# Patient Record
Sex: MALE | Race: WHITE | NOT HISPANIC OR LATINO | Employment: FULL TIME | URBAN - METROPOLITAN AREA
[De-identification: names, ages, dates, MRNs, and addresses within clinical notes are randomized per-mention and may not be internally consistent; named-entity substitution may affect disease eponyms.]

---

## 2018-07-03 ENCOUNTER — TELEPHONE (OUTPATIENT)
Dept: FAMILY MEDICINE | Facility: CLINIC | Age: 43
End: 2018-07-03

## 2018-07-03 DIAGNOSIS — C43.9 MALIGNANT MELANOMA, UNSPECIFIED SITE: Primary | ICD-10-CM

## 2018-07-03 NOTE — TELEPHONE ENCOUNTER
----- Message from Starr Guzman sent at 7/3/2018 11:30 AM CDT -----  Contact: self  Patient need to speak to someone about getting in today   Patient states he has a melanoma on his ear and has had it twice    Patient has appointment schedule for 8/7 but says he states he need to be seen today if possible    Please call to advice [phone]

## 2018-07-03 NOTE — TELEPHONE ENCOUNTER
Spoke with pt.  He stated that he has had melanoma x 2 in the past and would like to be seen today if possible because he is a . I explained that we cannot see him today and asked if he would like a referral to dermatology and he would like that to be done, and would like to be seen by one this week if possible. I explained that we will put the dermatology referral in and can go from there.  Thanks,Sheila

## 2018-07-03 NOTE — TELEPHONE ENCOUNTER
AILYN for pt with Dr. Avalos's contact information and notifying him that referral has been faxed to Dr. Avalos.  Sheila

## 2019-10-09 ENCOUNTER — HOSPITAL ENCOUNTER (EMERGENCY)
Facility: HOSPITAL | Age: 44
Discharge: HOME | End: 2019-10-09
Attending: INTERNAL MEDICINE
Payer: COMMERCIAL

## 2019-10-09 VITALS
SYSTOLIC BLOOD PRESSURE: 132 MMHG | WEIGHT: 280 LBS | DIASTOLIC BLOOD PRESSURE: 79 MMHG | OXYGEN SATURATION: 97 % | TEMPERATURE: 98 F | HEART RATE: 84 BPM | RESPIRATION RATE: 16 BRPM

## 2019-10-09 DIAGNOSIS — M79.606 LEG PAIN: ICD-10-CM

## 2019-10-09 DIAGNOSIS — M71.20 BAKER'S CYST: ICD-10-CM

## 2019-10-09 DIAGNOSIS — M71.22 SYNOVIAL CYST OF LEFT POPLITEAL SPACE: Primary | ICD-10-CM

## 2019-10-09 DIAGNOSIS — M23.92 KNEE DERANGEMENT SYNDROME, LEFT: ICD-10-CM

## 2019-10-09 LAB
ALBUMIN SERPL BCP-MCNC: 4.2 G/DL (ref 3.5–5.2)
ALP SERPL-CCNC: 71 U/L (ref 55–135)
ALT SERPL W/O P-5'-P-CCNC: 37 U/L (ref 10–44)
ANION GAP SERPL CALC-SCNC: 7 MMOL/L (ref 8–16)
APTT BLDCRRT: 27.1 SEC (ref 21–32)
AST SERPL-CCNC: 31 U/L (ref 10–40)
BASOPHILS # BLD AUTO: 0.03 K/UL (ref 0–0.2)
BASOPHILS NFR BLD: 0.6 % (ref 0–1.9)
BILIRUB SERPL-MCNC: 0.7 MG/DL (ref 0.1–1)
BUN SERPL-MCNC: 17 MG/DL (ref 6–20)
CALCIUM SERPL-MCNC: 9 MG/DL (ref 8.7–10.5)
CHLORIDE SERPL-SCNC: 105 MMOL/L (ref 95–110)
CO2 SERPL-SCNC: 24 MMOL/L (ref 23–29)
CREAT SERPL-MCNC: 0.9 MG/DL (ref 0.5–1.4)
D DIMER PPP FEU-MCNC: <100 NG/ML (ref 100–400)
DIFFERENTIAL METHOD: NORMAL
EOSINOPHIL # BLD AUTO: 0.2 K/UL (ref 0–0.5)
EOSINOPHIL NFR BLD: 3.7 % (ref 0–8)
ERYTHROCYTE [DISTWIDTH] IN BLOOD BY AUTOMATED COUNT: 12.3 % (ref 11.5–14.5)
EST. GFR  (AFRICAN AMERICAN): >60 ML/MIN/1.73 M^2
EST. GFR  (NON AFRICAN AMERICAN): >60 ML/MIN/1.73 M^2
GLUCOSE SERPL-MCNC: 132 MG/DL (ref 70–110)
HCT VFR BLD AUTO: 43 % (ref 40–54)
HGB BLD-MCNC: 14.5 G/DL (ref 14–18)
IMM GRANULOCYTES # BLD AUTO: 0.01 K/UL (ref 0–0.04)
IMM GRANULOCYTES NFR BLD AUTO: 0.2 % (ref 0–0.5)
INR PPP: 1 (ref 0.8–1.2)
LYMPHOCYTES # BLD AUTO: 1.5 K/UL (ref 1–4.8)
LYMPHOCYTES NFR BLD: 27.4 % (ref 18–48)
MCH RBC QN AUTO: 29.7 PG (ref 27–31)
MCHC RBC AUTO-ENTMCNC: 33.7 G/DL (ref 32–36)
MCV RBC AUTO: 88 FL (ref 82–98)
MONOCYTES # BLD AUTO: 0.4 K/UL (ref 0.3–1)
MONOCYTES NFR BLD: 6.7 % (ref 4–15)
NEUTROPHILS # BLD AUTO: 3.3 K/UL (ref 1.8–7.7)
NEUTROPHILS NFR BLD: 61.4 % (ref 38–73)
NRBC BLD-RTO: 0 /100 WBC
PLATELET # BLD AUTO: 177 K/UL (ref 150–350)
PMV BLD AUTO: 10.5 FL (ref 9.2–12.9)
POTASSIUM SERPL-SCNC: 3.9 MMOL/L (ref 3.5–5.1)
PROT SERPL-MCNC: 7.2 G/DL (ref 6–8.4)
PROTHROMBIN TIME: 10.7 SEC (ref 9–12.5)
RBC # BLD AUTO: 4.88 M/UL (ref 4.6–6.2)
SODIUM SERPL-SCNC: 136 MMOL/L (ref 136–145)
WBC # BLD AUTO: 5.41 K/UL (ref 3.9–12.7)

## 2019-10-09 PROCEDURE — 80053 COMPREHEN METABOLIC PANEL: CPT

## 2019-10-09 PROCEDURE — 93971 EXTREMITY STUDY: CPT | Mod: 26,LT,, | Performed by: RADIOLOGY

## 2019-10-09 PROCEDURE — 36415 COLL VENOUS BLD VENIPUNCTURE: CPT

## 2019-10-09 PROCEDURE — 85379 FIBRIN DEGRADATION QUANT: CPT

## 2019-10-09 PROCEDURE — 93971 EXTREMITY STUDY: CPT | Mod: TC,LT

## 2019-10-09 PROCEDURE — 93971 US LOWER EXTREMITY VEINS LEFT: ICD-10-PCS | Mod: 26,LT,, | Performed by: RADIOLOGY

## 2019-10-09 PROCEDURE — 99284 EMERGENCY DEPT VISIT MOD MDM: CPT | Mod: 25

## 2019-10-09 PROCEDURE — 85025 COMPLETE CBC W/AUTO DIFF WBC: CPT

## 2019-10-09 PROCEDURE — 85610 PROTHROMBIN TIME: CPT

## 2019-10-09 PROCEDURE — 85730 THROMBOPLASTIN TIME PARTIAL: CPT

## 2019-10-09 RX ORDER — DICLOFENAC SODIUM 30 MG/G
GEL TOPICAL
Qty: 120 G | Refills: 4 | Status: SHIPPED | OUTPATIENT
Start: 2019-10-09

## 2019-10-09 RX ORDER — HYDROCODONE BITARTRATE AND ACETAMINOPHEN 10; 325 MG/1; MG/1
1 TABLET ORAL
COMMUNITY

## 2019-10-09 RX ORDER — DICLOFENAC SODIUM 30 MG/G
GEL TOPICAL
Qty: 120 G | Refills: 4 | Status: SHIPPED | OUTPATIENT
Start: 2019-10-09 | End: 2019-10-09 | Stop reason: SDUPTHER

## 2019-10-09 RX ORDER — AMLODIPINE BESYLATE 10 MG/1
10 TABLET ORAL DAILY
COMMUNITY

## 2019-10-09 NOTE — ED PROVIDER NOTES
Encounter Date: 10/9/2019       History     Chief Complaint   Patient presents with    Leg Pain     LEFT leg pain and tingling. Worried about a blod clot     Patient comes in with left leg pain with numbness and tingling going down the leg into the left great toe and 2nd toe.  His tingling and also goes up stream into the thigh posteriorly.  He has a history of knee injury approximately 2 months ago when his son ran into him and buccal his knee backwards.  Prior to that no injuries.    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  He is yeimy treated by [facility] in [place].  His last blood work about 2 weeks ago was normal.        Review of patient's allergies indicates:  No Known Allergies  Past Medical History:   Diagnosis Date    Cancer     melanoma    Hypertension      History reviewed. No pertinent surgical history.  History reviewed. No pertinent family history.  Social History     Tobacco Use    Smoking status: Current Some Day Smoker     Types: Cigarettes   Substance Use Topics    Alcohol use: Yes     Comment: rare    Drug use: Not on file     Review of Systems   Constitutional: Negative for fever.   HENT: Negative for sore throat.    Respiratory: Negative for shortness of breath.    Cardiovascular: Negative for chest pain.   Gastrointestinal: Negative for nausea.   Genitourinary: Negative for dysuria.   Musculoskeletal: Negative for back pain.   Skin: Negative for rash.   Neurological: Negative for weakness.   Hematological: Does not bruise/bleed easily.   All other systems reviewed and are negative.      Physical Exam     Initial Vitals [10/09/19 0813]   BP Pulse Resp Temp SpO2   132/79 84 16 98.1 °F (36.7 °C) 97 %      MAP       --         Physical Exam    Nursing note and vitals reviewed.  Constitutional: Vital signs are normal. He appears well-developed and well-nourished. He is active and cooperative.   HENT:   Head: Normocephalic and  atraumatic.   Eyes: Conjunctivae and lids are normal. Lids are everted and swept, no foreign bodies found.   Neck: Trachea normal, normal range of motion and full passive range of motion without pain. Neck supple.   Cardiovascular: Normal rate, regular rhythm, S1 normal, S2 normal, normal heart sounds, intact distal pulses and normal pulses.  No extrasystoles are present.    Pulmonary/Chest: Breath sounds normal.   Abdominal: Soft. Normal appearance and bowel sounds are normal.   Musculoskeletal: Normal range of motion.        Legs:  Patient has a moderate sized Baker's cyst in the posterior aspect of the left knee.  Pressure on this reproduces neurological symptoms as well as pain. He has no swelling in the anterior aspect of the knee   Neurological: He is alert. He has normal reflexes. GCS eye subscore is 4. GCS verbal subscore is 5. GCS motor subscore is 6.   Skin: Skin is warm, dry and intact. Capillary refill takes less than 2 seconds.   Psychiatric: He has a normal mood and affect. His speech is normal and behavior is normal. Cognition and memory are normal.         ED Course   Procedures  Labs Reviewed   D DIMER, QUANTITATIVE   CBC W/ AUTO DIFFERENTIAL   APTT   PROTIME-INR   COMPREHENSIVE METABOLIC PANEL          Imaging Results    None          Medical Decision Making:   Clinical Tests:   Lab Tests: Ordered and Reviewed  The following lab test(s) were unremarkable: CMP and CBC       <> Summary of Lab: Labs are normal, D-dimer is normal  Radiological Study: Ordered and Reviewed  ED Management:  Ultrasound shows no evidence of DVT.  Clear-cut Baker's cyst is not sharp as well but clinically it is present.  Recommend seeing orthopedist for further evaluation of knee pain and possible Baker cyst paired.  Patient has significant disability which impairs him in his work as a  with left knee.  Recommend see orthopedist a as soon as possible                      Clinical Impression:       ICD-10-CM  ICD-9-CM   1. Synovial cyst of left popliteal space M71.22 727.51   2. Baker's cyst M71.20 727.51   3. Leg pain M79.606 729.5   4. Knee derangement syndrome, left M23.92 717.9                                Johny Wagner MD  10/09/19 0953

## 2019-10-31 DIAGNOSIS — M25.562 LEFT KNEE PAIN, UNSPECIFIED CHRONICITY: Primary | ICD-10-CM

## 2025-03-26 ENCOUNTER — OFFICE VISIT (OUTPATIENT)
Dept: OTOLARYNGOLOGY | Facility: CLINIC | Age: 50
End: 2025-03-26
Payer: COMMERCIAL

## 2025-03-26 ENCOUNTER — TELEPHONE (OUTPATIENT)
Dept: FAMILY MEDICINE | Facility: CLINIC | Age: 50
End: 2025-03-26
Payer: COMMERCIAL

## 2025-03-26 VITALS — HEIGHT: 70 IN | WEIGHT: 280 LBS | BODY MASS INDEX: 40.09 KG/M2

## 2025-03-26 DIAGNOSIS — K13.79 LESION OF SOFT PALATE: Primary | ICD-10-CM

## 2025-03-26 PROCEDURE — 99999 PR PBB SHADOW E&M-NEW PATIENT-LVL III: CPT | Mod: PBBFAC,,, | Performed by: OTOLARYNGOLOGY

## 2025-03-26 PROCEDURE — 88305 TISSUE EXAM BY PATHOLOGIST: CPT | Mod: TC | Performed by: OTOLARYNGOLOGY

## 2025-03-26 RX ORDER — TELMISARTAN 40 MG/1
40 TABLET ORAL
COMMUNITY
Start: 2024-08-26

## 2025-03-26 NOTE — TELEPHONE ENCOUNTER
Spoke with patient made est care appointment for Aug 22 @ 330pm  
----- Message from Armando Wyman MD sent at 3/26/2025  3:05 PM CDT -----  Regarding: FW: Appointment  Call this cas and set up an est care visit w/ me. Thanks!  ----- Message -----  From: Corey Menjivar MD  Sent: 3/26/2025   2:58 PM CDT  To: Armando Wyman MD  Subject: Appointment                                      This fellow does not use the portal and he was asking me for a family doctor recommendation and I encouraged him to call Dr. Wyman you guys might give him a call and see if he wants to schedule something.  
no concerns

## 2025-03-26 NOTE — PROGRESS NOTES
"Subjective:       Patient ID: Amador Pittman is a 49 y.o. male.    Chief Complaint: Mouth Injury (Patient comes in with c/o " growth in the back of the roof of my mouth where the start of my throat is. I've had melanoma before and surgery multiple times so I want it looked at." )      This generally healthy 49-year-old he has a smoking history and he comes in reporting that he has noted a lesion of his soft palate on the left than has concerns about what it represents          Objective:      ENT Physical Exam    So his soft palate on the left just past the soft palate hard palate junction has a 4 mm lesion that appears to be a pedunculated verruca vulgaris.    We discussed the options and in the interest of being sure that that is what this represented and to be rid of it we injected the base with 1% lidocaine and epinephrine he has a fairly notable gag reflex but tolerated this and using a cup forceps to grasp the lesion and small iris scissors to cut the base it was a excised at the base sent for pathology in formaldehyde.  The base was cauterized with silver nitrate and only bled about a drop or two.        Assessment:       1. Lesion of soft palate         Plan:          So I will contact him with the pathology once that is available I think that is very unlikely this would represent anything beyond a verruca vulgaris but regardless we are going to communicate the findings            "

## 2025-03-28 ENCOUNTER — RESULTS FOLLOW-UP (OUTPATIENT)
Dept: OTOLARYNGOLOGY | Facility: CLINIC | Age: 50
End: 2025-03-28

## 2025-03-28 LAB
ESTROGEN SERPL-MCNC: NORMAL PG/ML
INSULIN SERPL-ACNC: NORMAL U[IU]/ML
LAB AP GROSS DESCRIPTION: NORMAL
LAB AP PERFORMING LOCATION(S): NORMAL
LAB AP REPORT FOOTNOTES: NORMAL
T3RU NFR SERPL: NORMAL %

## 2025-03-28 NOTE — PROGRESS NOTES
Mondays fine that is got as it use the portal tell him that that thing I took off the roof of his mouth was a wart as it appeared.

## 2025-04-01 ENCOUNTER — OFFICE VISIT (OUTPATIENT)
Dept: URGENT CARE | Facility: CLINIC | Age: 50
End: 2025-04-01
Payer: COMMERCIAL

## 2025-04-01 ENCOUNTER — OFFICE VISIT (OUTPATIENT)
Dept: ORTHOPEDICS | Facility: CLINIC | Age: 50
End: 2025-04-01
Payer: COMMERCIAL

## 2025-04-01 VITALS
BODY MASS INDEX: 39.94 KG/M2 | HEIGHT: 70 IN | HEART RATE: 83 BPM | DIASTOLIC BLOOD PRESSURE: 75 MMHG | TEMPERATURE: 98 F | WEIGHT: 279 LBS | RESPIRATION RATE: 18 BRPM | SYSTOLIC BLOOD PRESSURE: 136 MMHG | OXYGEN SATURATION: 97 %

## 2025-04-01 VITALS — RESPIRATION RATE: 16 BRPM

## 2025-04-01 DIAGNOSIS — M25.511 CHRONIC RIGHT SHOULDER PAIN: ICD-10-CM

## 2025-04-01 DIAGNOSIS — G89.29 CHRONIC RIGHT SHOULDER PAIN: ICD-10-CM

## 2025-04-01 DIAGNOSIS — M25.511 ACUTE PAIN OF RIGHT SHOULDER: Primary | ICD-10-CM

## 2025-04-01 DIAGNOSIS — M75.41 IMPINGEMENT SYNDROME OF RIGHT SHOULDER: Primary | ICD-10-CM

## 2025-04-01 PROCEDURE — 99999 PR PBB SHADOW E&M-EST. PATIENT-LVL III: CPT | Mod: PBBFAC,,, | Performed by: FAMILY MEDICINE

## 2025-04-01 PROCEDURE — 99204 OFFICE O/P NEW MOD 45 MIN: CPT | Mod: 25,S$GLB,, | Performed by: NURSE PRACTITIONER

## 2025-04-01 PROCEDURE — 1159F MED LIST DOCD IN RCRD: CPT | Mod: CPTII,S$GLB,, | Performed by: FAMILY MEDICINE

## 2025-04-01 PROCEDURE — 20610 DRAIN/INJ JOINT/BURSA W/O US: CPT | Mod: RT,S$GLB,, | Performed by: FAMILY MEDICINE

## 2025-04-01 PROCEDURE — 96372 THER/PROPH/DIAG INJ SC/IM: CPT | Mod: S$GLB,,, | Performed by: NURSE PRACTITIONER

## 2025-04-01 PROCEDURE — 99204 OFFICE O/P NEW MOD 45 MIN: CPT | Mod: 25,S$GLB,, | Performed by: FAMILY MEDICINE

## 2025-04-01 RX ORDER — KETOROLAC TROMETHAMINE 30 MG/ML
30 INJECTION, SOLUTION INTRAMUSCULAR; INTRAVENOUS
Status: COMPLETED | OUTPATIENT
Start: 2025-04-01 | End: 2025-04-01

## 2025-04-01 RX ORDER — METHYLPREDNISOLONE ACETATE 80 MG/ML
80 INJECTION, SUSPENSION INTRA-ARTICULAR; INTRALESIONAL; INTRAMUSCULAR; SOFT TISSUE
Status: DISCONTINUED | OUTPATIENT
Start: 2025-04-01 | End: 2025-04-01 | Stop reason: HOSPADM

## 2025-04-01 RX ORDER — DEXAMETHASONE SODIUM PHOSPHATE 4 MG/ML
8 INJECTION, SOLUTION INTRA-ARTICULAR; INTRALESIONAL; INTRAMUSCULAR; INTRAVENOUS; SOFT TISSUE
Status: COMPLETED | OUTPATIENT
Start: 2025-04-01 | End: 2025-04-01

## 2025-04-01 RX ADMIN — KETOROLAC TROMETHAMINE 30 MG: 30 INJECTION, SOLUTION INTRAMUSCULAR; INTRAVENOUS at 11:04

## 2025-04-01 RX ADMIN — DEXAMETHASONE SODIUM PHOSPHATE 8 MG: 4 INJECTION, SOLUTION INTRA-ARTICULAR; INTRALESIONAL; INTRAMUSCULAR; INTRAVENOUS; SOFT TISSUE at 11:04

## 2025-04-01 RX ADMIN — METHYLPREDNISOLONE ACETATE 80 MG: 80 INJECTION, SUSPENSION INTRA-ARTICULAR; INTRALESIONAL; INTRAMUSCULAR; SOFT TISSUE at 01:04

## 2025-04-01 NOTE — LETTER
April 1, 2025      [facility]  [address]    [place] [address]  Phone: [phone]  Fax: [phone]       Patient: Amador Pittman   YOB: 1975  Date of Visit: 04/01/2025    To Whom It May Concern:    Justino Pittman  was at [facility] on 04/01/2025. The patient may return to work/school on 04/01/2025 with no restrictions. If you have any questions or concerns, or if I can be of further assistance, please do not hesitate to contact me.    Sincerely,    Irina Tejeda, NP

## 2025-04-01 NOTE — PROGRESS NOTES
Subjective     Patient ID: Amador Pittman is a 49 y.o. male.    Chief Complaint: Pain of the Right Shoulder    49-year-old male here today with complaints of a right-sided shoulder pain.  This has been ongoing for the last two months but became acutely worse yesterday when he felt a pop in his lateral shoulder.  Particularly notes that he is had trouble doing upper body workouts especially overhead lifts and pushing exercises.  Seems to have less pain with pulling exercises.  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  He also have went through some chemotherapy for a skin malignancy some years ago and was warned of potential joint issues as a side effect.  Over the last two months he has particularly noticed issues with sleeping.  He does believe he is sleeping on his shoulder in an awkward manner but it will wake him up from sleep with pain at times.  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  He has tried taking Tylenol with no relief.    Pain  Pertinent negatives include no chest pain, chills, congestion, coughing, headaches, rash or sore throat.       Review of Systems   Constitutional: Negative for chills and decreased appetite.   HENT:  Negative for congestion and sore throat.    Eyes:  Negative for blurred vision.   Cardiovascular:  Negative for chest pain, dyspnea on exertion and palpitations.   Respiratory:  Negative for cough and shortness of breath.    Skin:  Negative for rash.   Neurological:  Negative for difficulty with concentration, disturbances in coordination and headaches.   Psychiatric/Behavioral:  Negative for altered mental status, depression, hallucinations, memory loss and suicidal ideas.           Objective     General    Nursing note and vitals reviewed.  Constitutional: He is oriented to person, place, and time. He appears well-developed  and well-nourished.   HENT:   Nose: Nose normal.   Eyes: EOM are normal. Pupils are equal, round, and reactive to light.   Neck: Neck supple.   Cardiovascular:  Normal rate.            Pulmonary/Chest: Effort normal.   Abdominal: Soft.   Neurological: He is alert and oriented to person, place, and time. He has normal reflexes.   Psychiatric: He has a normal mood and affect. His behavior is normal. Judgment and thought content normal.         Right Shoulder Exam     Inspection/Observation   Swelling: absent  Bruising: absent  Scars: absent  Deformity: absent  Scapular Winging: absent  Scapular Dyskinesia: negative    Tenderness   The patient is tender to palpation of the acromioclavicular joint and supraspinatus.    Range of Motion   Active abduction:  150   Passive abduction:  normal   Extension:  normal   Forward Flexion:  150   Forward Elevation: normal  Adduction: 90     Tests & Signs   Roach test: positive  Impingement: positive  Active Compression Test (Daggett's Sign): negative  Speed's Test: negative  Anterior Drawer Test: 0   Posterior Drawer Test: 0    Other   Sensation: normal    Left Shoulder Exam   Left shoulder exam is normal.       Muscle Strength   Right Upper Extremity   Shoulder Abduction: 5/5   Shoulder Internal Rotation: 5/5   Shoulder External Rotation: 5/5   Supraspinatus: 5/5   Subscapularis: 5/5   Biceps: 5/5     Vascular Exam     Right Pulses      Radial:                    2+      Physical Exam  Vitals and nursing note reviewed.   Constitutional:       Appearance: He is well-developed and well-nourished.   HENT:      Nose: Nose normal.   Eyes:      Extraocular Movements: EOM normal.      Pupils: Pupils are equal, round, and reactive to light.   Cardiovascular:      Rate and Rhythm: Normal rate.      Pulses:           Radial pulses are 2+ on the right side.   Pulmonary:      Effort: Pulmonary effort is normal.   Abdominal:      Palpations: Abdomen is soft.   Musculoskeletal:      Right  shoulder: No swelling or deformity.      Cervical back: Normal range of motion and neck supple.   Neurological:      Mental Status: He is alert and oriented to person, place, and time.      Deep Tendon Reflexes: Reflexes are normal and symmetric.   Psychiatric:         Mood and Affect: Mood and affect normal.         Behavior: Behavior normal.         Thought Content: Thought content normal.         Judgment: Judgment normal.   X-ray images ordered obtained interpreted by me.  They show well-preserved joint spacing with no obvious bony abnormalities and no degenerative changes noted.  There are no acute fractures and no evidence of any soft tissue injury.  Unremarkable x-ray.          Assessment and Plan     Encounter Diagnoses   Name Primary?    Chronic right shoulder pain     Impingement syndrome of right shoulder Yes         Amador was seen today for pain.    Diagnoses and all orders for this visit:    Impingement syndrome of right shoulder    Chronic right shoulder pain    Several impingement signs on physical exam today.  He would not suspect any significant rotator cuff tears.  Since this is causing him some significant daily issues I offered him a cortisone injection in the shoulder and he agreed with this.  We would like him to follow up in one month for recheck of this issue.  He had does not have significant relief we could consider an MRI of his shoulder.    Large Joint Aspiration/Injection: R subacromial bursa    Date/Time: 4/1/2025 1:00 PM    Performed by: Roberto Whitt MD  Authorized by: Roberto Whitt MD    Consent Done?:  Yes (Verbal)  Indications:  Arthritis and pain  Site marked: the procedure site was marked    Timeout: prior to procedure the correct patient, procedure, and site was verified    Prep: patient was prepped and draped in usual sterile fashion      Local anesthesia used?: Yes    Local anesthetic:  Lidocaine 1% without epinephrine and topical anesthetic  Anesthetic total (ml):   2      Details:  Needle Size:  22 G  Ultrasonic Guidance for needle placement?: No    Approach:  Posterior  Location:  Shoulder  Site:  R subacromial bursa  Medications:  80 mg methylPREDNISolone acetate 80 mg/mL  Patient tolerance:  Patient tolerated the procedure well with no immediate complications

## 2025-04-01 NOTE — PROGRESS NOTES
"Subjective:      Patient ID: Amador Pittman is a 49 y.o. male.    Vitals:  height is 5' 10" (1.778 m) and weight is 126.6 kg (279 lb). His temperature is 97.8 °F (36.6 °C). His blood pressure is 136/75 and his pulse is 83. His respiration is 18 and oxygen saturation is 97%.     Chief Complaint: Shoulder Pain    Patient is a 49-year-old male who presents to clinic today for evaluation of right shoulder pain.  Patient reports his shoulder has been hurting this time for about 2 months.  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  Patient has used Tylenol without any relief in symptoms.  Patient denies any known injury or previous surgery to this area.  Patient reports many years of overuse this area for work.    Shoulder Pain   The pain is present in the right shoulder. This is a new problem. The problem has been unchanged. Pertinent negatives include no fever or headaches. Exacerbated by: pulling.       Constitution: Negative for chills, sweating, fatigue and fever.   HENT:  Negative for ear pain, congestion and sore throat.    Neck: neck negative.   Cardiovascular: Negative.  Negative for chest pain and palpitations.   Eyes: Negative.    Respiratory: Negative.  Negative for chest tightness, cough, shortness of breath and wheezing.    Gastrointestinal: Negative.  Negative for abdominal pain, nausea, vomiting and diarrhea.   Endocrine: negative.   Genitourinary: Negative.    Musculoskeletal: Negative.  Positive for pain. Negative for muscle ache.   Skin: Negative.  Negative for color change, pale, rash and erythema.   Allergic/Immunologic: Negative.    Neurological: Negative.  Negative for dizziness, light-headedness, passing out, headaches, disorientation and altered mental status.   Hematologic/Lymphatic: Negative.    Psychiatric/Behavioral: Negative.  Negative for altered mental status, disorientation and confusion.     "   Objective:     Physical Exam   Constitutional: He is oriented to person, place, and time. He appears well-developed. He is cooperative.  Non-toxic appearance. He does not appear ill. No distress.   HENT:   Head: Normocephalic and atraumatic.   Ears:   Right Ear: Hearing, tympanic membrane, external ear and ear canal normal.   Left Ear: Hearing, tympanic membrane, external ear and ear canal normal.   Nose: Nose normal. No mucosal edema, rhinorrhea, nasal deformity or congestion. No epistaxis. Right sinus exhibits no maxillary sinus tenderness and no frontal sinus tenderness. Left sinus exhibits no maxillary sinus tenderness and no frontal sinus tenderness.   Mouth/Throat: Uvula is midline, oropharynx is clear and moist and mucous membranes are normal. Mucous membranes are moist. No trismus in the jaw. Normal dentition. No uvula swelling. No oropharyngeal exudate or posterior oropharyngeal erythema. Oropharynx is clear.   Eyes: Conjunctivae and lids are normal. Pupils are equal, round, and reactive to light. Right eye exhibits no discharge. Left eye exhibits no discharge. No scleral icterus.   Neck: Trachea normal and phonation normal. Neck supple. No neck rigidity present.   Cardiovascular: Normal rate, regular rhythm, normal heart sounds and normal pulses.   Pulmonary/Chest: Effort normal and breath sounds normal. No respiratory distress. He has no wheezes. He has no rhonchi. He has no rales.   Abdominal: Normal appearance and bowel sounds are normal. He exhibits no distension. Soft. There is no abdominal tenderness.   Musculoskeletal:      Right shoulder: He exhibits decreased range of motion and decreased strength. He exhibits no tenderness, no bony tenderness and no crepitus.      Left shoulder: Normal.      Cervical back: He exhibits no tenderness.   Lymphadenopathy:     He has no cervical adenopathy.   Neurological: He is alert and oriented to person, place, and time. He exhibits normal muscle tone.   Skin:  Skin is warm, dry, intact, not diaphoretic, not pale and no rash. Capillary refill takes less than 2 seconds. No erythema   Psychiatric: His speech is normal and behavior is normal. Judgment and thought content normal.   Nursing note and vitals reviewed.      Assessment:     1. Acute pain of right shoulder        Plan:       Acute pain of right shoulder  -     XR SHOULDER COMPLETE 2 OR MORE VIEWS RIGHT; Future; Expected date: 04/01/2025  -     Ambulatory referral/consult to Orthopedics    Other orders  -     dexAMETHasone injection 8 mg  -     ketorolac injection 30 mg                Right shoulder x-ray:   FINDINGS:  No dislocation is seen.  A fracture of the scapula, humerus or clavicle is not seen.  The acromioclavicular and glenohumeral joints are within normal limits.     Impression:     Negative shoulder radiographs.    Decadron 8 mg IM and Toradol 60 mg IM in clinic.  Patient tolerated well.  No complications noted.  Take medications as prescribed.  Use of no other NSAIDs today may resume ibuprofen tomorrow ; may rotate with Tylenol.    Recommend rotating ice and warm moist heat as directed.  Follow-up with PCP in 1-2 days.  Return to clinic as needed.  To ED for any new or acutely worsening symptoms.  Patient in agreement with plan of care.

## 2025-04-22 ENCOUNTER — LAB VISIT (OUTPATIENT)
Dept: LAB | Facility: CLINIC | Age: 50
End: 2025-04-22
Payer: COMMERCIAL

## 2025-04-22 ENCOUNTER — PATIENT MESSAGE (OUTPATIENT)
Dept: FAMILY MEDICINE | Facility: CLINIC | Age: 50
End: 2025-04-22

## 2025-04-22 ENCOUNTER — OFFICE VISIT (OUTPATIENT)
Dept: FAMILY MEDICINE | Facility: CLINIC | Age: 50
End: 2025-04-22
Payer: COMMERCIAL

## 2025-04-22 VITALS
DIASTOLIC BLOOD PRESSURE: 65 MMHG | SYSTOLIC BLOOD PRESSURE: 125 MMHG | HEIGHT: 70 IN | BODY MASS INDEX: 39.37 KG/M2 | OXYGEN SATURATION: 97 % | RESPIRATION RATE: 18 BRPM | WEIGHT: 275 LBS | HEART RATE: 65 BPM

## 2025-04-22 DIAGNOSIS — Z11.4 ENCOUNTER FOR SCREENING FOR HIV: ICD-10-CM

## 2025-04-22 DIAGNOSIS — E66.813 CLASS 3 SEVERE OBESITY DUE TO EXCESS CALORIES WITH SERIOUS COMORBIDITY AND BODY MASS INDEX (BMI) OF 40.0 TO 44.9 IN ADULT: ICD-10-CM

## 2025-04-22 DIAGNOSIS — E29.1 HYPOGONADISM IN MALE: ICD-10-CM

## 2025-04-22 DIAGNOSIS — I10 ESSENTIAL HYPERTENSION: ICD-10-CM

## 2025-04-22 DIAGNOSIS — E66.01 CLASS 3 SEVERE OBESITY DUE TO EXCESS CALORIES WITH SERIOUS COMORBIDITY AND BODY MASS INDEX (BMI) OF 40.0 TO 44.9 IN ADULT: ICD-10-CM

## 2025-04-22 DIAGNOSIS — Z11.59 NEED FOR HEPATITIS C SCREENING TEST: ICD-10-CM

## 2025-04-22 DIAGNOSIS — Z76.89 ENCOUNTER TO ESTABLISH CARE: Primary | ICD-10-CM

## 2025-04-22 PROBLEM — Z80.0 FAMILY HISTORY OF COLON CANCER: Status: ACTIVE | Noted: 2025-04-22

## 2025-04-22 PROBLEM — K21.9 GERD (GASTROESOPHAGEAL REFLUX DISEASE): Status: ACTIVE | Noted: 2025-04-22

## 2025-04-22 LAB
ABSOLUTE EOSINOPHIL (OHS): 0.18 K/UL
ABSOLUTE MONOCYTE (OHS): 0.51 K/UL (ref 0.3–1)
ABSOLUTE NEUTROPHIL COUNT (OHS): 4.28 K/UL (ref 1.8–7.7)
ALBUMIN SERPL BCP-MCNC: 4.1 G/DL (ref 3.5–5.2)
ALP SERPL-CCNC: 75 UNIT/L (ref 40–150)
ALT SERPL W/O P-5'-P-CCNC: 28 UNIT/L (ref 10–44)
ANION GAP (OHS): 9 MMOL/L (ref 8–16)
AST SERPL-CCNC: 21 UNIT/L (ref 11–45)
BASOPHILS # BLD AUTO: 0.02 K/UL
BASOPHILS NFR BLD AUTO: 0.3 %
BILIRUB SERPL-MCNC: 0.6 MG/DL (ref 0.1–1)
BUN SERPL-MCNC: 10 MG/DL (ref 6–20)
CALCIUM SERPL-MCNC: 9.2 MG/DL (ref 8.7–10.5)
CHLORIDE SERPL-SCNC: 105 MMOL/L (ref 95–110)
CHOLEST SERPL-MCNC: 162 MG/DL (ref 120–199)
CHOLEST/HDLC SERPL: 4.6 {RATIO} (ref 2–5)
CO2 SERPL-SCNC: 24 MMOL/L (ref 23–29)
CREAT SERPL-MCNC: 0.9 MG/DL (ref 0.5–1.4)
EAG (OHS): 117 MG/DL (ref 68–131)
ERYTHROCYTE [DISTWIDTH] IN BLOOD BY AUTOMATED COUNT: 12.1 % (ref 11.5–14.5)
GFR SERPLBLD CREATININE-BSD FMLA CKD-EPI: >60 ML/MIN/1.73/M2
GLUCOSE SERPL-MCNC: 87 MG/DL (ref 70–110)
HBA1C MFR BLD: 5.7 % (ref 4–5.6)
HCT VFR BLD AUTO: 41.6 % (ref 40–54)
HCV AB SERPL QL IA: NORMAL
HDLC SERPL-MCNC: 35 MG/DL (ref 40–75)
HDLC SERPL: 21.6 % (ref 20–50)
HGB BLD-MCNC: 14.3 GM/DL (ref 14–18)
HIV 1+2 AB+HIV1 P24 AG SERPL QL IA: NORMAL
IMM GRANULOCYTES # BLD AUTO: 0.01 K/UL (ref 0–0.04)
IMM GRANULOCYTES NFR BLD AUTO: 0.1 % (ref 0–0.5)
LDLC SERPL CALC-MCNC: 104.6 MG/DL (ref 63–159)
LYMPHOCYTES # BLD AUTO: 1.84 K/UL (ref 1–4.8)
MCH RBC QN AUTO: 30.6 PG (ref 27–31)
MCHC RBC AUTO-ENTMCNC: 34.4 G/DL (ref 32–36)
MCV RBC AUTO: 89 FL (ref 82–98)
NONHDLC SERPL-MCNC: 127 MG/DL
NUCLEATED RBC (/100WBC) (OHS): 0 /100 WBC
PLATELET # BLD AUTO: 169 K/UL (ref 150–450)
PMV BLD AUTO: 9.9 FL (ref 9.2–12.9)
POTASSIUM SERPL-SCNC: 4.3 MMOL/L (ref 3.5–5.1)
PROT SERPL-MCNC: 7 GM/DL (ref 6–8.4)
RBC # BLD AUTO: 4.68 M/UL (ref 4.6–6.2)
RELATIVE EOSINOPHIL (OHS): 2.6 %
RELATIVE LYMPHOCYTE (OHS): 26.9 % (ref 18–48)
RELATIVE MONOCYTE (OHS): 7.5 % (ref 4–15)
RELATIVE NEUTROPHIL (OHS): 62.6 % (ref 38–73)
SODIUM SERPL-SCNC: 138 MMOL/L (ref 136–145)
TRIGL SERPL-MCNC: 112 MG/DL (ref 30–150)
TSH SERPL-ACNC: 1.53 UIU/ML (ref 0.4–4)
WBC # BLD AUTO: 6.84 K/UL (ref 3.9–12.7)

## 2025-04-22 PROCEDURE — 83036 HEMOGLOBIN GLYCOSYLATED A1C: CPT

## 2025-04-22 PROCEDURE — 85025 COMPLETE CBC W/AUTO DIFF WBC: CPT

## 2025-04-22 PROCEDURE — 36415 COLL VENOUS BLD VENIPUNCTURE: CPT | Mod: ,,, | Performed by: STUDENT IN AN ORGANIZED HEALTH CARE EDUCATION/TRAINING PROGRAM

## 2025-04-22 PROCEDURE — 80061 LIPID PANEL: CPT

## 2025-04-22 PROCEDURE — 86803 HEPATITIS C AB TEST: CPT | Performed by: STUDENT IN AN ORGANIZED HEALTH CARE EDUCATION/TRAINING PROGRAM

## 2025-04-22 PROCEDURE — 87389 HIV-1 AG W/HIV-1&-2 AB AG IA: CPT | Performed by: STUDENT IN AN ORGANIZED HEALTH CARE EDUCATION/TRAINING PROGRAM

## 2025-04-22 PROCEDURE — 84443 ASSAY THYROID STIM HORMONE: CPT

## 2025-04-22 PROCEDURE — 80053 COMPREHEN METABOLIC PANEL: CPT

## 2025-04-22 NOTE — PROGRESS NOTES
"  [facility]  [address], [address]  [place] [address]    Subjective     Patient ID: Amador Pittman is a 49 y.o. male who comes to the clinic to establish care.    Chief Complaint: Establish Care (Est care)    HTN - takes telmisartan 40mg and amlodipine 10mg daily    Low Testosterone - testosterone was 212 at outside clinic    ROS negative unless stated above       Objective     Vitals:    04/22/25 1524 04/22/25 1616   BP: (!) 140/80 125/65   BP Location: Left arm Right arm   Patient Position: Sitting Sitting   Pulse: 65    Resp: 18    SpO2: 97%    Weight: 124.7 kg (275 lb)    Height: 5' 10" (1.778 m)         Wt Readings from Last 3 Encounters:   04/22/25 1524 124.7 kg (275 lb)   04/01/25 0842 126.6 kg (279 lb)   03/26/25 1438 127 kg (279 lb 15.8 oz)        Physical Exam  Vitals reviewed.   Constitutional:       Appearance: Normal appearance.   HENT:      Head: Normocephalic and atraumatic.   Eyes:      Extraocular Movements: Extraocular movements intact.      Pupils: Pupils are equal, round, and reactive to light.   Cardiovascular:      Rate and Rhythm: Normal rate.      Pulses: Normal pulses.      Heart sounds: Normal heart sounds.   Pulmonary:      Effort: Pulmonary effort is normal. No respiratory distress.      Breath sounds: Normal breath sounds.   Musculoskeletal:         General: Normal range of motion.      Cervical back: Normal range of motion and neck supple.   Skin:     General: Skin is dry.      Capillary Refill: Capillary refill takes less than 2 seconds.   Neurological:      General: No focal deficit present.      Mental Status: He is alert and oriented to person, place, and time. Mental status is at baseline.   Psychiatric:         Mood and Affect: Mood normal.         Behavior: Behavior normal.         Thought Content: Thought content normal.         Current Outpatient Medications   Medication Instructions    amLODIPine (NORVASC) 10 mg, " Daily    telmisartan (MICARDIS) 40 mg    testosterone cypionate 100 mg, Intramuscular, Every 14 days           Assessment and Plan     1. Encounter to establish care    2. Need for hepatitis C screening test  -     Hepatitis C antibody; Future; Expected date: 04/22/2025    3. Encounter for screening for HIV  -     HIV 1/2 Ag/Ab (4th Gen); Future; Expected date: 04/22/2025    4. Essential hypertension    5. Class 3 severe obesity due to excess calories with serious comorbidity and body mass index (BMI) of 40.0 to 44.9 in adult  -     Comprehensive Metabolic Panel; Future  -     CBC auto differential; Future; Expected date: 04/22/2025  -     Lipid panel; Future; Expected date: 04/22/2025  -     Hemoglobin A1c; Future; Expected date: 04/22/2025  -     TSH; Future; Expected date: 04/22/2025    6. Hypogonadism in male  -     testosterone cypionate 200 mg/mL Kit; Inject 100 mg into the muscle every 14 (fourteen) days.  Dispense: 1 kit; Refill: 3        Here to establish care    For low testosterone, starting testosterone 100mg q14 days    For HTN, above goal, continue regimen as above    For obesity, encouraged weight loss    Obtain colonoscopy records from [facility]/ Dr. Brar, was given a 5 year recall    RTC in 1 month, lab review, recheck testosterone    I encouraged the patient to take all medications as prescribed and to keep follow up appointments with their providers. ED precautions given more concerning issues. Questions were invited and answered. Patient stated they had no other concerns. Follow up sooner if needed.     Armando Wyman MD  04/22/2025 3:12 PM

## 2025-04-23 ENCOUNTER — RESULTS FOLLOW-UP (OUTPATIENT)
Dept: FAMILY MEDICINE | Facility: CLINIC | Age: 50
End: 2025-04-23

## 2025-04-24 ENCOUNTER — PATIENT OUTREACH (OUTPATIENT)
Dept: ADMINISTRATIVE | Facility: HOSPITAL | Age: 50
End: 2025-04-24
Payer: COMMERCIAL

## 2025-04-24 NOTE — LETTER
AUTHORIZATION FOR RELEASE OF   CONFIDENTIAL INFORMATION    Dear [facility] Medical Records,    We are seeing Amador Pitmtan, date of birth 1975, in the clinic at [facility]. Armando Wyman MD is the patient's PCP. Amador Pittman has an outstanding lab/procedure at the time we reviewed his chart. In order to help keep his health information updated, he has authorized us to request the following medical record(s):        (  )  MAMMOGRAM                                      ( X )  COLONOSCOPY      (  )  PAP SMEAR                                          (  )  OUTSIDE LAB RESULTS     (  )  DEXA SCAN                                          (  )  EYE EXAM            (  )  FOOT EXAM                                          (  )  ENTIRE RECORD     (  )  OUTSIDE IMMUNIZATIONS                 (  )  _______________         Please fax records to [facility], McLarty, Michael, MD at [phone]    Thanks so much and have a great day!    Kary Martin LPN [facility]  [address] [place] [address]  - [phone]  F- [phone]           Patient Name: Amador Pittman  : 1975  Patient Phone #: [phone]

## 2025-04-24 NOTE — PROGRESS NOTES
Population Health Chart Review & Patient Outreach Details      Additional Pop Health Notes:               Updates Requested / Reviewed:      Updated Care Coordination Note         Health Maintenance Topics Overdue:      [facility] Score: 1     Colon Cancer Screening                       Health Maintenance Topic(s) Outreach Outcomes & Actions Taken:    Colorectal Cancer Screening - Outreach Outcomes & Actions Taken  : External Records Requested & Care Team Updated if Applicable

## 2025-04-29 ENCOUNTER — OFFICE VISIT (OUTPATIENT)
Dept: ORTHOPEDICS | Facility: CLINIC | Age: 50
End: 2025-04-29
Payer: COMMERCIAL

## 2025-04-29 VITALS — BODY MASS INDEX: 39.36 KG/M2 | WEIGHT: 274.94 LBS | HEIGHT: 70 IN

## 2025-04-29 DIAGNOSIS — G89.29 CHRONIC RIGHT SHOULDER PAIN: ICD-10-CM

## 2025-04-29 DIAGNOSIS — M75.41 IMPINGEMENT SYNDROME OF RIGHT SHOULDER: Primary | ICD-10-CM

## 2025-04-29 DIAGNOSIS — M25.511 CHRONIC RIGHT SHOULDER PAIN: ICD-10-CM

## 2025-04-29 PROCEDURE — 1159F MED LIST DOCD IN RCRD: CPT | Mod: CPTII,S$GLB,, | Performed by: FAMILY MEDICINE

## 2025-04-29 PROCEDURE — 4010F ACE/ARB THERAPY RXD/TAKEN: CPT | Mod: CPTII,S$GLB,, | Performed by: FAMILY MEDICINE

## 2025-04-29 PROCEDURE — 99214 OFFICE O/P EST MOD 30 MIN: CPT | Mod: S$GLB,,, | Performed by: FAMILY MEDICINE

## 2025-04-29 PROCEDURE — 3008F BODY MASS INDEX DOCD: CPT | Mod: CPTII,S$GLB,, | Performed by: FAMILY MEDICINE

## 2025-04-29 PROCEDURE — 99999 PR PBB SHADOW E&M-EST. PATIENT-LVL III: CPT | Mod: PBBFAC,,, | Performed by: FAMILY MEDICINE

## 2025-04-29 PROCEDURE — 3044F HG A1C LEVEL LT 7.0%: CPT | Mod: CPTII,S$GLB,, | Performed by: FAMILY MEDICINE

## 2025-04-29 NOTE — PROGRESS NOTES
Subjective     Patient ID: Amador Pittman is a 49 y.o. male.    Chief Complaint: Follow-up of the Right Shoulder (Pt here for R) shoulder 1mo f/u. States he had gotten relief from injection until he began using his shoulder. )    Patient returns for one-month follow up of right shoulder pain secondary to impingement syndrome.  We gave an injection in his shoulder April 1st.  Initially he had no pain in the right shoulder for one week.  However once he started using the shoulder again either with exercises in the gym or at work he began to feel pain slowly returned.  It is not as bad as it previously was but he still has a sensation of ache in his shoulder especially with use.    Follow-up  Pertinent negatives include no chest pain, chills, congestion, coughing, headaches, rash or sore throat.       Review of Systems   Constitutional: Negative for chills and decreased appetite.   HENT:  Negative for congestion and sore throat.    Eyes:  Negative for blurred vision.   Cardiovascular:  Negative for chest pain, dyspnea on exertion and palpitations.   Respiratory:  Negative for cough and shortness of breath.    Skin:  Negative for rash.   Neurological:  Negative for difficulty with concentration, disturbances in coordination and headaches.   Psychiatric/Behavioral:  Negative for altered mental status, depression, hallucinations, memory loss and suicidal ideas.           Objective     General    Nursing note and vitals reviewed.  Constitutional: He is oriented to person, place, and time. He appears well-developed and well-nourished.   HENT:   Nose: Nose normal.   Eyes: EOM are normal. Pupils are equal, round, and reactive to light.   Neck: Neck supple.   Cardiovascular:  Normal rate.            Pulmonary/Chest: Effort normal.   Abdominal: Soft.   Neurological: He is alert and oriented to person, place, and time. He has normal reflexes.   Psychiatric: He has a normal mood and affect. His behavior is normal. Judgment and  thought content normal.         Right Shoulder Exam     Inspection/Observation   Swelling: absent  Bruising: absent  Scars: absent  Deformity: absent  Scapular Winging: absent  Scapular Dyskinesia: negative    Tenderness   The patient is tender to palpation of the supraspinatus.    Range of Motion   Active abduction:  150   Passive abduction:  normal   Extension:  normal   Forward Flexion:  150   Forward Elevation: normal  Adduction: 90     Tests & Signs   Roach test: positive  Impingement: negative  Active Compression Test (Wilson's Sign): negative  Speed's Test: negative  Anterior Drawer Test: 0   Posterior Drawer Test: 0    Other   Sensation: normal    Left Shoulder Exam   Left shoulder exam is normal.       Muscle Strength   Right Upper Extremity   Shoulder Abduction: 5/5   Shoulder Internal Rotation: 5/5   Shoulder External Rotation: 5/5   Supraspinatus: 5/5   Subscapularis: 5/5   Biceps: 5/5     Vascular Exam     Right Pulses      Radial:                    2+        Physical Exam  Vitals and nursing note reviewed.   Constitutional:       Appearance: He is well-developed and well-nourished.   HENT:      Nose: Nose normal.   Eyes:      Extraocular Movements: EOM normal.      Pupils: Pupils are equal, round, and reactive to light.   Cardiovascular:      Rate and Rhythm: Normal rate.      Pulses:           Radial pulses are 2+ on the right side.   Pulmonary:      Effort: Pulmonary effort is normal.   Abdominal:      Palpations: Abdomen is soft.   Musculoskeletal:      Right shoulder: No swelling or deformity.      Cervical back: Normal range of motion and neck supple.   Neurological:      Mental Status: He is alert and oriented to person, place, and time.      Deep Tendon Reflexes: Reflexes are normal and symmetric.   Psychiatric:         Mood and Affect: Mood and affect normal.         Behavior: Behavior normal.         Thought Content: Thought content normal.         Judgment: Judgment normal.              Assessment and Plan     Encounter Diagnoses   Name Primary?    Chronic right shoulder pain     Impingement syndrome of right shoulder Yes         Amador was seen today for follow-up.    Diagnoses and all orders for this visit:    Impingement syndrome of right shoulder  -     Ambulatory Referral/Consult to Physical Therapy; Future    Chronic right shoulder pain  -     Ambulatory Referral/Consult to Physical Therapy; Future      We again discussed additional treatment options for his shoulder.  Brought up the possibility of do an MRI of the shoulder in evaluating for possible Tenex.  We also discussed the possibility of formal physical therapy.  He is concerned about the recovery time and having to miss work after Tenex procedure.  Going to place an order for physical therapy for him.  Recommend he follow up here in about two months or as needed for recheck of his shoulder.  We could consider repeating injection in July if he continues to have pain.

## 2025-05-13 ENCOUNTER — PATIENT MESSAGE (OUTPATIENT)
Dept: FAMILY MEDICINE | Facility: CLINIC | Age: 50
End: 2025-05-13
Payer: COMMERCIAL

## 2025-05-19 DIAGNOSIS — M25.511 CHRONIC RIGHT SHOULDER PAIN: Primary | ICD-10-CM

## 2025-05-19 DIAGNOSIS — G89.29 CHRONIC RIGHT SHOULDER PAIN: Primary | ICD-10-CM

## 2025-05-19 DIAGNOSIS — M75.41 IMPINGEMENT SYNDROME OF RIGHT SHOULDER: ICD-10-CM

## 2025-05-23 ENCOUNTER — CLINICAL SUPPORT (OUTPATIENT)
Dept: REHABILITATION | Facility: HOSPITAL | Age: 50
End: 2025-05-23
Payer: COMMERCIAL

## 2025-05-23 ENCOUNTER — PATIENT MESSAGE (OUTPATIENT)
Dept: INTERNAL MEDICINE | Facility: CLINIC | Age: 50
End: 2025-05-23
Payer: COMMERCIAL

## 2025-05-23 ENCOUNTER — OFFICE VISIT (OUTPATIENT)
Dept: FAMILY MEDICINE | Facility: CLINIC | Age: 50
End: 2025-05-23
Payer: COMMERCIAL

## 2025-05-23 VITALS
BODY MASS INDEX: 40.02 KG/M2 | WEIGHT: 279.56 LBS | SYSTOLIC BLOOD PRESSURE: 132 MMHG | OXYGEN SATURATION: 97 % | RESPIRATION RATE: 18 BRPM | DIASTOLIC BLOOD PRESSURE: 71 MMHG | HEART RATE: 82 BPM | HEIGHT: 70 IN

## 2025-05-23 DIAGNOSIS — M25.511 CHRONIC RIGHT SHOULDER PAIN: Primary | ICD-10-CM

## 2025-05-23 DIAGNOSIS — E29.1 HYPOGONADISM IN MALE: Primary | ICD-10-CM

## 2025-05-23 DIAGNOSIS — M25.611 DECREASED SHOULDER MOBILITY, RIGHT: ICD-10-CM

## 2025-05-23 DIAGNOSIS — M75.41 IMPINGEMENT SYNDROME OF RIGHT SHOULDER: ICD-10-CM

## 2025-05-23 DIAGNOSIS — G89.29 CHRONIC RIGHT SHOULDER PAIN: Primary | ICD-10-CM

## 2025-05-23 PROBLEM — M25.512 CHRONIC LEFT SHOULDER PAIN: Status: ACTIVE | Noted: 2025-05-23

## 2025-05-23 PROCEDURE — 84403 ASSAY OF TOTAL TESTOSTERONE: CPT | Performed by: STUDENT IN AN ORGANIZED HEALTH CARE EDUCATION/TRAINING PROGRAM

## 2025-05-23 PROCEDURE — 97161 PT EVAL LOW COMPLEX 20 MIN: CPT | Mod: PN

## 2025-05-23 PROCEDURE — 97530 THERAPEUTIC ACTIVITIES: CPT | Mod: PN

## 2025-05-23 RX ORDER — TESTOSTERONE CYPIONATE 200 MG/ML
INJECTION, SOLUTION INTRAMUSCULAR
COMMUNITY
Start: 2025-04-24

## 2025-05-23 RX ORDER — TADALAFIL 2.5 MG/1
2.5 TABLET ORAL DAILY
Qty: 90 EACH | Refills: 1 | Status: SHIPPED | OUTPATIENT
Start: 2025-05-23 | End: 2026-05-23

## 2025-05-23 NOTE — PROGRESS NOTES
Outpatient Rehab    Physical Therapy Evaluation    Patient Name: Amador Pittman  MRN: [id number]  YOB: 1975  Encounter Date: 5/23/2025    Therapy Diagnosis:   Encounter Diagnoses   Name Primary?    Chronic right shoulder pain Yes    Impingement syndrome of right shoulder     Decreased shoulder mobility, right      Physician: Roberto Whitt MD    Physician Orders: Eval and Treat  Medical Diagnosis: Chronic right shoulder pain  Impingement syndrome of right shoulder    Visit # / Visits Authorized:  1 / 1  Insurance Authorization Period: 4/29/2025 to 4/29/2026  Date of Evaluation: 5/23/2025  Plan of Care Certification: 5/23/2025 to 8/23/25     Time In: 0940   Time Out: 1040  Total Time (in minutes): 60   Total Billable Time (in minutes): 60    Intake Outcome Measure for FOTO Survey    Therapist reviewed FOTO scores for Amador Pittman on 5/23/2025.   FOTO report - see Media section or FOTO account episode details.     Intake Score:  %    Precautions:       Subjective   History of Present Illness  Amador is a 49 y.o. male who reports to physical therapy with a chief concern of Right shoulder pain.     The patient reports a medical diagnosis of M25.511,G89.29 (ICD-10-CM) - Chronic right shoulder pain  M75.41 (ICD-10-CM) - Impingement syndrome of right shoulder.    Diagnostic tests related to this condition: X-ray.   X-Ray Details: neg    Dominant Hand: Right  History of Present Condition/Illness: Pt is a 48 y/o male that has had right shoulder pain for years, no known injury. He sleeps on his stomach with right arm up and under his pillow. He also works out heavy at the gym and has for a long time. He doesn't recall a specific injury. Saw MD on 4-1-25, had X-ray (neg) and got a steroid shot in the shoulder. It helped for a week, then pain returned. Had follow up on 4-29 and MD said he should try therapy or surgery would be the next option. Pt is a  and is gone for long periods at a time. Coming in for  therapy might be difficult. His left shoulder also bothers him but not near as much. Pain affects his sleep. No neck issues but has low back pain. PMH includes HTN.     Activities of Daily Living  Social history was obtained from Patient.       General Current Level of Function Comments: has pain with anything away from body or above shoulder level  Patient Responsibilities: Community mobility, Driving, Financial management, Health management, Home management, Laundry, Meal prep, Personal ADL, Shopping, Yard work          Currently independent with activities of daily living? Yes                Currently independent with instrumental activities of daily living? Yes              Pain     Patient reports a current pain level of 3/10. Pain at best is reported as 1/10. Pain at worst is reported as 6/10.   Location: top and front of shoulder  Clinical Progression (since onset): Stable  Pain Qualities: Throbbing, Dull, Grinding, Sharp  Pain-Relieving Factors: Change in position, Heat, Medications - over-the-counter, Rest, Other (Comment)  Other Pain-Relieving Factors: icy hot  Pain-Aggravating Factors: Exercise, Holding objects, Lifting, Reaching, Rotation, Sleeping, Other (Comment)  Other Pain-Aggravating Factors: leaning on right elbow, bench press, pulling, push ups           Past Medical History/Physical Systems Review:   Amador Pittman  has a past medical history of Cancer, colonic polyps, and Hypertension.    Amador Pittman  has a past surgical history that includes External ear surgery; LASIK; and Colonoscopy w/ polypectomy (09/15/2022).    Amador has a current medication list which includes the following prescription(s): amlodipine, tadalafil, telmisartan, testosterone cypionate, and testosterone cypionate.    Review of patient's allergies indicates:  No Known Allergies     Objective   Posture  Patient presents with a Forward head position.     Shoulders are Rounded.             Subcranial Range of Motion   Active  Restricted? Passive Restricted? Pain   Flexion         Protraction         Retraction           WNL    Shoulder Range of Motion  Right Shoulder   Active (deg) Passive (deg) Pain   Flexion 150       Extension         Scaption         ABduction 167       ADduction         Horizontal ABduction         Horizontal ADduction         External Rotation (Shoulder ABducted 0 degrees)         External Rotation (Shoulder ABducted 45 degrees)         External Rotation (Shoulder ABducted 90 degrees) 50       Internal Rotation (Shoulder ABducted 0 degrees)         Internal Rotation (Shoulder ABducted 45 degrees)         Internal Rotation (Shoulder ABducted 90 degrees) 40         Left Shoulder   Active (deg) Passive (deg) Pain   Flexion 148       Extension         Scaption         ABduction 168       ADduction         Horizontal ABduction         Horizontal ADduction         External Rotation (Shoulder ABducted 0 degrees)         External Rotation (Shoulder ABducted 45 degrees)         External Rotation (Shoulder ABducted 90 degrees) 63       Internal Rotation (Shoulder ABducted 0 degrees)         Internal Rotation (Shoulder ABducted 45 degrees)         Internal Rotation (Shoulder ABducted 90 degrees) 55           Shoulder, Elbow, or Forearm Range of Motion Details: Elbow AROM is WNL         Shoulder Strength - Planes of Motion   Right Strength Right Pain Left Strength Left  Pain   Flexion 4-         Extension 4         ABduction 4         ADduction           Horizontal ABduction           Horizontal ADduction           Internal Rotation 0° 4-         Internal Rotation 90°           External Rotation 0° 4         External Rotation 90°               Shoulder Strength - Rotator Cuff Muscles   Right Strength Right Pain Left Strength Left  Pain   Supraspinatus 4-         Infraspinatus 4         Teres Minor 4         Subscapularis 4                     Shoulder Special Tests  Positive: Right Resisted Supine External  Rotation  Impingement Tests  Positive: Right Roach-Max and Right Neer's       Only slight/mild discomfort with biceps load and resisted supine ER           Treatment:  Therapeutic Activity  TA 1: Cane up wall/IR behind back x 10 each  TA 2: Table slides FF 5 x 5 sec  TA 3: Codman's circles CW/CCW x 15 ea  TA 4: Pt education regarding condition/precautions/treatment/HEP instruction    Time Entry(in minutes):  PT Evaluation (Low) Time Entry: 20  Therapeutic Activity Time Entry: 40    Assessment & Plan   Assessment  Amador presents with a condition of Low complexity.   Presentation of Symptoms: Stable  Will Comorbidities Impact Care: No       Functional Limitations: Activity tolerance, Bed mobility, Carrying objects, Completing work/school activities, Disrupted sleep pattern, Functional mobility, Pain when reaching, Pain with ADLs/IADLs, Participating in leisure activities, Participating in sports, Range of motion, Reaching  Impairments: Abnormal muscle firing, Abnormal muscle tone, Abnormal or restricted range of motion, Activity intolerance, Impaired physical strength, Lack of appropriate home exercise program, Pain with functional activity    Patient Goal for Therapy (PT): Return to PLF  Prognosis: Good  Assessment Details: Pt has right shoulder impingement that is causing pain, decreased shoulder mobility and decreased RC strength. He is a weight  and does heavy workouts at the gym on a regular basis. He is a  and his work duties are also rough on his shoulders. This has bothered him a long time but he's just put up with it. He has had one injection but no MRI. Pt wants to try therapy before thinking about surgery. He would benefit from skilled PT services to address the deficits identified in the objective section but his availability to come in for therapy may be compromised by his job.     Plan  From a physical therapy perspective, the patient would benefit from: Skilled Rehab  Services    Planned therapy interventions include: Therapeutic exercise, Therapeutic activities, Neuromuscular re-education, Manual therapy, ADLs/IADLs, and Other (Comment). Dry needling  Planned modalities to include: Cryotherapy (cold pack), Electrical stimulation - passive/unattended, Low-level laser therapy, Thermotherapy (hot pack), and Ultrasound.        Visit Frequency: 2 times Per Week for 6 Weeks.       This plan was discussed with Patient and Therapy assistant.   Discussion participants: Agreed Upon Plan of Care  Plan details: Pt will receive TE, NE, TA and manual therapy along with modalities prn to improve UE strength/ROM to facilitate return to PLF           The patient's spiritual, cultural, and educational needs were considered, and the patient is agreeable to the plan of care and goals.           Goals:   Active       LTG (6 weeks)        Decrease worst pain to 2/10       Start:  05/23/25    Expected End:  08/23/25            Improve FOTO score by 50%       Start:  05/23/25    Expected End:  08/23/25             Normal right UE MMT and AROM       Start:  05/23/25    Expected End:  08/23/25               STC (3 weeks)       Decrease worst pain to 3/10       Start:  05/23/25    Expected End:  06/13/25            Improve FOTO score by 25%       Start:  05/23/25    Expected End:  06/13/25            Improve R UE MMT and AROM by 50%       Start:  05/23/25    Expected End:  06/13/25                Elena Irizarry, PT

## 2025-05-23 NOTE — PROGRESS NOTES
"  [facility]  [address]  [place] [address]    Subjective     Patient ID: Amador Pittman is a 49 y.o. male who comes to the clinic for a follow up visit.    Chief Complaint: Health Maintenance (1 mos follow up)    HTN - takes telmisartan 40mg and amlodipine 10mg daily     Low Testosterone - takes testosterone 100mg q14 weeks, testosterone was 212 at outside clinic    ROS negative unless stated above       Objective     Vitals:    05/23/25 0758   BP: 132/71   BP Location: Right arm   Patient Position: Sitting   Pulse: 82   Resp: 18   SpO2: 97%   Weight: 126.8 kg (279 lb 8.7 oz)   Height: 5' 10" (1.778 m)        Wt Readings from Last 3 Encounters:   05/23/25 0758 126.8 kg (279 lb 8.7 oz)   04/29/25 1305 124.7 kg (274 lb 14.6 oz)   04/22/25 1524 124.7 kg (275 lb)        Physical Exam  Constitutional:       General: He is not in acute distress.     Appearance: Normal appearance. He is not ill-appearing.   HENT:      Head: Normocephalic and atraumatic.      Mouth/Throat:      Mouth: Mucous membranes are moist.   Eyes:      Extraocular Movements: Extraocular movements intact.      Pupils: Pupils are equal, round, and reactive to light.   Cardiovascular:      Rate and Rhythm: Normal rate.   Pulmonary:      Effort: Pulmonary effort is normal. No respiratory distress.   Musculoskeletal:         General: Normal range of motion.      Cervical back: Normal range of motion and neck supple.   Skin:     General: Skin is warm and dry.   Neurological:      General: No focal deficit present.      Mental Status: He is alert and oriented to person, place, and time. Mental status is at baseline.   Psychiatric:         Mood and Affect: Mood normal.         Behavior: Behavior normal.         Thought Content: Thought content normal.         Current Outpatient Medications   Medication Instructions    amLODIPine (NORVASC) 10 mg, Daily    tadalafiL (CIALIS) 2.5 mg, " Oral, Daily    telmisartan (MICARDIS) 40 mg    testosterone cypionate (DEPOTESTOTERONE CYPIONATE) 200 mg/mL injection SMARTSI.5 Milliliter(s) IM Every 2 Weeks    testosterone cypionate 100 mg, Intramuscular, Every 14 days           Assessment and Plan     1. Hypogonadism in male  -     Testosterone; Future; Expected date: 2025  -     tadalafiL (CIALIS) 2.5 mg Tab; Take 2.5 mg by mouth once daily.  Dispense: 90 each; Refill: 1        Here for follow up.    For low testosterone, continue testosterone 100mg q14 days, checking testosterone today, may change to q7 days    For weight loss, I told him that unfortunately I don't write stimulants for weight loss given cardiovascular side effects (phendimetrazine in this case). Will continue ahead with dietary measures     For HTN, above goal, continue regimen as above     For obesity, encouraged weight loss    The visit today included increased complexity associated with the care of an episodic problem, namely HTN, that was addressed and managed via longitudinal care.    RTC in 3 months        I encouraged the patient to take all medications as prescribed and to keep follow up appointments with their providers. ED precautions given for more concerning issues. Questions were invited and answered. Patient stated they had no other concerns. Follow up sooner if needed.     Armando Wyman MD  2025 8:14 AM

## 2025-05-26 ENCOUNTER — RESULTS FOLLOW-UP (OUTPATIENT)
Dept: FAMILY MEDICINE | Facility: CLINIC | Age: 50
End: 2025-05-26

## 2025-05-26 DIAGNOSIS — E29.1 HYPOGONADISM IN MALE: Primary | ICD-10-CM

## 2025-05-26 RX ORDER — TESTOSTERONE CYPIONATE 1000 MG/10ML
100 INJECTION, SOLUTION INTRAMUSCULAR
Qty: 20 ML | Refills: 0 | Status: SHIPPED | OUTPATIENT
Start: 2025-05-26 | End: 2025-05-28 | Stop reason: SDUPTHER

## 2025-05-27 ENCOUNTER — PATIENT MESSAGE (OUTPATIENT)
Dept: FAMILY MEDICINE | Facility: CLINIC | Age: 50
End: 2025-05-27
Payer: COMMERCIAL

## 2025-05-27 DIAGNOSIS — E29.1 HYPOGONADISM IN MALE: ICD-10-CM

## 2025-05-28 RX ORDER — TESTOSTERONE CYPIONATE 1000 MG/10ML
100 INJECTION, SOLUTION INTRAMUSCULAR
Qty: 12 ML | Refills: 1 | Status: SHIPPED | OUTPATIENT
Start: 2025-05-28 | End: 2025-08-26

## 2025-06-11 ENCOUNTER — PATIENT MESSAGE (OUTPATIENT)
Dept: INTERNAL MEDICINE | Facility: CLINIC | Age: 50
End: 2025-06-11
Payer: COMMERCIAL

## 2025-06-13 ENCOUNTER — PATIENT MESSAGE (OUTPATIENT)
Dept: FAMILY MEDICINE | Facility: CLINIC | Age: 50
End: 2025-06-13
Payer: COMMERCIAL

## 2025-07-10 ENCOUNTER — PATIENT MESSAGE (OUTPATIENT)
Dept: FAMILY MEDICINE | Facility: CLINIC | Age: 50
End: 2025-07-10
Payer: COMMERCIAL

## 2025-07-11 ENCOUNTER — OFFICE VISIT (OUTPATIENT)
Dept: FAMILY MEDICINE | Facility: CLINIC | Age: 50
End: 2025-07-11
Payer: COMMERCIAL

## 2025-07-11 VITALS
RESPIRATION RATE: 18 BRPM | HEART RATE: 83 BPM | BODY MASS INDEX: 41.39 KG/M2 | SYSTOLIC BLOOD PRESSURE: 138 MMHG | HEIGHT: 70 IN | DIASTOLIC BLOOD PRESSURE: 82 MMHG | WEIGHT: 289.13 LBS | OXYGEN SATURATION: 98 %

## 2025-07-11 DIAGNOSIS — R00.2 PALPITATIONS: ICD-10-CM

## 2025-07-11 DIAGNOSIS — R07.9 CHEST PAIN, UNSPECIFIED TYPE: Primary | ICD-10-CM

## 2025-07-11 NOTE — LETTER
July 11, 2025      [facility]  [address] [address]  Phone: [phone]       Patient: Amador Pittman   YOB: 1975  Date of Visit: 07/11/2025    To Whom It May Concern:    Justino Pittman  was at [facility] on 07/11/2025. The patient may return to work/school on 7/12/2025  with no restrictions. If you have any questions or concerns, or if I can be of further assistance, please do not hesitate to contact me.    Sincerely,    Briana Flor MA

## 2025-07-11 NOTE — PROGRESS NOTES
"  [facility]  [address]  [place] [address]    Subjective     Patient ID: Amador Pittman is a 50 y.o. male who comes to the clinic for a follow up visit.    Chief Complaint: Health Maintenance (Anxiety)    Chest tightness - occurred the other night, was very stressed and felt extreme claustrophobia, this was his very first time. Thinks it might have been a panic attack but felt contricting chest pressure but also constriction around his entire body. He then had another one. No chest pain per se but felt like "I was getting compressed", had SOB as well    ROS negative unless stated above       Objective     Vitals:    07/11/25 1616   BP: (!) 153/65   BP Location: Left arm   Patient Position: Sitting   Pulse: 83   Resp: 18   SpO2: 98%   Weight: 131.1 kg (289 lb 2.1 oz)   Height: 5' 10" (1.778 m)        Wt Readings from Last 3 Encounters:   07/11/25 1616 131.1 kg (289 lb 2.1 oz)   05/23/25 0758 126.8 kg (279 lb 8.7 oz)   04/29/25 1305 124.7 kg (274 lb 14.6 oz)        Physical Exam  Vitals reviewed.   Constitutional:       Appearance: Normal appearance.   HENT:      Head: Normocephalic and atraumatic.   Eyes:      Extraocular Movements: Extraocular movements intact.      Pupils: Pupils are equal, round, and reactive to light.   Cardiovascular:      Rate and Rhythm: Normal rate.      Pulses: Normal pulses.      Heart sounds: Normal heart sounds.   Pulmonary:      Effort: Pulmonary effort is normal. No respiratory distress.      Breath sounds: Normal breath sounds.   Musculoskeletal:         General: Normal range of motion.      Cervical back: Normal range of motion and neck supple.   Skin:     General: Skin is dry.      Capillary Refill: Capillary refill takes less than 2 seconds.   Neurological:      General: No focal deficit present.      Mental Status: He is alert and oriented to person, place, and time. Mental status is at baseline. "   Psychiatric:         Mood and Affect: Mood normal.         Behavior: Behavior normal.         Thought Content: Thought content normal.         Current Outpatient Medications   Medication Instructions    amLODIPine (NORVASC) 10 mg, Daily    tadalafiL (CIALIS) 2.5 mg, Oral, Daily    telmisartan (MICARDIS) 40 mg    testosterone cypionate (DEPOTESTOTERONE CYPIONATE) 100 mg, Intramuscular, Every 7 days           Assessment and Plan     1. Chest pain, unspecified type  -     Exercise Stress - EKG; Future  -     Holter monitor - 48 hour; Future  -     Ambulatory referral/consult to Cardiology; Future; Expected date: 07/18/2025  -     Echo; Future    2. Palpitations        Here for follow up.    For chest pain, getting above workup    For palpitations, getting workup as above    For HTN, will hold off on additional meds    The visit today included increased complexity associated with the care of an episodic problem, namely palpitations, that was addressed and managed via longitudinal care.    RTC in 4-6 weeks        I encouraged the patient to take all medications as prescribed and to keep follow up appointments with their providers. ED precautions given for more concerning issues. Questions were invited and answered. Patient stated they had no other concerns. Follow up sooner if needed.     Armando Wyman MD  07/11/2025 4:25 PM

## 2025-07-26 ENCOUNTER — HOSPITAL ENCOUNTER (EMERGENCY)
Facility: HOSPITAL | Age: 50
Discharge: HOME | End: 2025-07-26
Attending: EMERGENCY MEDICINE
Payer: COMMERCIAL

## 2025-07-26 VITALS
RESPIRATION RATE: 20 BRPM | TEMPERATURE: 98 F | SYSTOLIC BLOOD PRESSURE: 137 MMHG | HEIGHT: 70 IN | BODY MASS INDEX: 42.95 KG/M2 | HEART RATE: 80 BPM | DIASTOLIC BLOOD PRESSURE: 75 MMHG | WEIGHT: 300 LBS | OXYGEN SATURATION: 97 %

## 2025-07-26 DIAGNOSIS — R07.89 CHEST DISCOMFORT: ICD-10-CM

## 2025-07-26 DIAGNOSIS — R07.9 CHEST PAIN: ICD-10-CM

## 2025-07-26 LAB
ABSOLUTE EOSINOPHIL (OHS): 0.3 K/UL
ABSOLUTE MONOCYTE (OHS): 0.72 K/UL (ref 0.3–1)
ABSOLUTE NEUTROPHIL COUNT (OHS): 4.31 K/UL (ref 1.8–7.7)
ALBUMIN SERPL BCP-MCNC: 4.1 G/DL (ref 3.5–5.2)
ALP SERPL-CCNC: 61 UNIT/L (ref 40–150)
ALT SERPL W/O P-5'-P-CCNC: 31 UNIT/L (ref 10–44)
ANION GAP (OHS): 9 MMOL/L (ref 8–16)
AST SERPL-CCNC: 31 UNIT/L (ref 11–45)
BASOPHILS # BLD AUTO: 0.03 K/UL
BASOPHILS NFR BLD AUTO: 0.4 %
BILIRUB SERPL-MCNC: 0.4 MG/DL (ref 0.1–1)
BUN SERPL-MCNC: 15 MG/DL (ref 6–20)
CALCIUM SERPL-MCNC: 8.9 MG/DL (ref 8.7–10.5)
CHLORIDE SERPL-SCNC: 106 MMOL/L (ref 95–110)
CO2 SERPL-SCNC: 22 MMOL/L (ref 23–29)
CREAT SERPL-MCNC: 0.8 MG/DL (ref 0.5–1.4)
D DIMER PPP IA.FEU-MCNC: 0.53 MG/L FEU
ERYTHROCYTE [DISTWIDTH] IN BLOOD BY AUTOMATED COUNT: 12.9 % (ref 11.5–14.5)
GFR SERPLBLD CREATININE-BSD FMLA CKD-EPI: >60 ML/MIN/1.73/M2
GLUCOSE SERPL-MCNC: 152 MG/DL (ref 70–110)
HCT VFR BLD AUTO: 43.2 % (ref 40–54)
HGB BLD-MCNC: 15.1 GM/DL (ref 14–18)
IMM GRANULOCYTES # BLD AUTO: 0.02 K/UL (ref 0–0.04)
IMM GRANULOCYTES NFR BLD AUTO: 0.3 % (ref 0–0.5)
LYMPHOCYTES # BLD AUTO: 2.05 K/UL (ref 1–4.8)
MCH RBC QN AUTO: 29.5 PG (ref 27–31)
MCHC RBC AUTO-ENTMCNC: 35 G/DL (ref 32–36)
MCV RBC AUTO: 84 FL (ref 82–98)
NT-PROBNP SERPL-MCNC: <16 PG/ML
NUCLEATED RBC (/100WBC) (OHS): 0 /100 WBC
PLATELET # BLD AUTO: 182 K/UL (ref 150–450)
PMV BLD AUTO: 10 FL (ref 9.2–12.9)
POTASSIUM SERPL-SCNC: 3.8 MMOL/L (ref 3.5–5.1)
PROT SERPL-MCNC: 6.7 GM/DL (ref 6–8.4)
RBC # BLD AUTO: 5.12 M/UL (ref 4.6–6.2)
RELATIVE EOSINOPHIL (OHS): 4 %
RELATIVE LYMPHOCYTE (OHS): 27.6 % (ref 18–48)
RELATIVE MONOCYTE (OHS): 9.7 % (ref 4–15)
RELATIVE NEUTROPHIL (OHS): 58 % (ref 38–73)
SODIUM SERPL-SCNC: 137 MMOL/L (ref 136–145)
TROPONIN I SERPL HS-MCNC: <3 NG/L
WBC # BLD AUTO: 7.43 K/UL (ref 3.9–12.7)

## 2025-07-26 PROCEDURE — 93005 ELECTROCARDIOGRAM TRACING: CPT | Performed by: INTERNAL MEDICINE

## 2025-07-26 PROCEDURE — 99285 EMERGENCY DEPT VISIT HI MDM: CPT | Mod: 25

## 2025-07-26 PROCEDURE — 85379 FIBRIN DEGRADATION QUANT: CPT | Performed by: EMERGENCY MEDICINE

## 2025-07-26 PROCEDURE — 80053 COMPREHEN METABOLIC PANEL: CPT | Performed by: EMERGENCY MEDICINE

## 2025-07-26 PROCEDURE — 93010 ELECTROCARDIOGRAM REPORT: CPT | Mod: ,,, | Performed by: INTERNAL MEDICINE

## 2025-07-26 PROCEDURE — 71045 X-RAY EXAM CHEST 1 VIEW: CPT | Mod: 26,,, | Performed by: STUDENT IN AN ORGANIZED HEALTH CARE EDUCATION/TRAINING PROGRAM

## 2025-07-26 PROCEDURE — 85025 COMPLETE CBC W/AUTO DIFF WBC: CPT | Performed by: EMERGENCY MEDICINE

## 2025-07-26 PROCEDURE — 94760 N-INVAS EAR/PLS OXIMETRY 1: CPT

## 2025-07-26 PROCEDURE — 83880 ASSAY OF NATRIURETIC PEPTIDE: CPT | Performed by: EMERGENCY MEDICINE

## 2025-07-26 PROCEDURE — 71045 X-RAY EXAM CHEST 1 VIEW: CPT | Mod: TC

## 2025-07-26 PROCEDURE — 84484 ASSAY OF TROPONIN QUANT: CPT | Performed by: EMERGENCY MEDICINE

## 2025-07-26 PROCEDURE — 25000003 PHARM REV CODE 250: Performed by: EMERGENCY MEDICINE

## 2025-07-26 RX ORDER — ASPIRIN 325 MG
325 TABLET ORAL
Status: COMPLETED | OUTPATIENT
Start: 2025-07-26 | End: 2025-07-26

## 2025-07-26 RX ORDER — FUROSEMIDE 20 MG/1
20 TABLET ORAL DAILY
Qty: 5 TABLET | Refills: 0 | Status: SHIPPED | OUTPATIENT
Start: 2025-07-26 | End: 2025-07-31

## 2025-07-26 RX ADMIN — ASPIRIN 325 MG: 325 TABLET ORAL at 01:07

## 2025-07-26 NOTE — DISCHARGE INSTRUCTIONS
As we discussed, your cardiac labs were negative, showing no evidence of heart muscle damage.  This does not, however, mean that you have clean coronary arteries and you still need to follow-up with your cardiologist as planned.  Also follow-up with your primary care provider after the weekend.  Take Lasix as prescribed for 5 days to see if this helps with your swelling in other symptoms.  Return here as needed or if worse in any way.

## 2025-07-26 NOTE — ED PROVIDER NOTES
"Encounter Date: 7/26/2025       History     Chief Complaint   Patient presents with    Chest Pain     50-year-old male here from home via private vehicle complaining of left upper chest discomfort.  Symptoms started a little over 1 hour ago.  No nausea, no vomiting.  Mild shortness of breath.  He states that his doctor has been planning on doing a cardiac workup for him.  He states that over the last month or so he has been gaining weight and has been "swelling".  Denies any cardiac history.  Took an 81 mg aspirin prior to coming here.  Symptoms started when he was changing a tire.  States his discomfort is 2/10..  Pain does not radiate.      Review of patient's allergies indicates:  No Known Allergies  Past Medical History:   Diagnosis Date    Cancer     melanoma    Hx of colonic polyps     Hypertension      Past Surgical History:   Procedure Laterality Date    COLONOSCOPY W/ POLYPECTOMY  09/15/2022    EXTERNAL EAR SURGERY      LASIK       Family History   Problem Relation Name Age of Onset    Cancer Mother      Cancer Father       Social History[1]  Review of Systems   Constitutional:  Positive for unexpected weight change. Negative for chills and fever.   HENT:  Negative for congestion, rhinorrhea and sore throat.    Respiratory:  Positive for chest tightness. Negative for cough, shortness of breath and wheezing.    Cardiovascular:  Positive for leg swelling. Negative for chest pain (Dull ache, left upper chest) and palpitations.   Gastrointestinal:  Negative for abdominal pain, blood in stool, diarrhea, nausea and vomiting.   Genitourinary:  Negative for enuresis, flank pain, frequency and hematuria.   Musculoskeletal:  Negative for back pain, myalgias, neck pain and neck stiffness.   Skin:  Negative for pallor and rash.   Neurological:  Negative for syncope, weakness, numbness and headaches.   Psychiatric/Behavioral:  Negative for dysphoric mood. The patient is not nervous/anxious.        Physical Exam "     Initial Vitals [07/26/25 1340]   BP Pulse Resp Temp SpO2   (!) 148/87 88 18 98.1 °F (36.7 °C) 97 %      MAP       --         Physical Exam    Nursing note and vitals reviewed.  Constitutional: He appears well-developed. He is not diaphoretic. No distress.   HENT:   Head: Normocephalic and atraumatic.   Nose: Nose normal. Mouth/Throat: Oropharynx is clear and moist. No oropharyngeal exudate.   Eyes: Conjunctivae and EOM are normal. Pupils are equal, round, and reactive to light. No scleral icterus.   Neck: Neck supple. No JVD present.   Normal range of motion.  Cardiovascular:  Normal rate, regular rhythm, normal heart sounds and intact distal pulses.           No murmur heard.  Pulmonary/Chest: Breath sounds normal. No stridor. No respiratory distress. He has no wheezes. He has no rhonchi. He has no rales.   Abdominal: Abdomen is soft. Bowel sounds are normal. He exhibits no distension. There is no abdominal tenderness.   Musculoskeletal:         General: No tenderness or edema. Normal range of motion.      Cervical back: Normal range of motion and neck supple.      Comments: Patient is somewhat obese, which makes it difficult to discern any obvious edema.  There is no pitting of the feet or ankles.     Neurological: He is alert and oriented to person, place, and time. He has normal strength. No cranial nerve deficit or sensory deficit. GCS score is 15. GCS eye subscore is 4. GCS verbal subscore is 5. GCS motor subscore is 6.   Skin: Skin is warm and dry. Capillary refill takes less than 2 seconds. No rash noted. No erythema.   Psychiatric: He has a normal mood and affect. His behavior is normal.         ED Course   Procedures  Labs Reviewed   COMPREHENSIVE METABOLIC PANEL - Abnormal       Result Value    Sodium 137      Potassium 3.8      Chloride 106      CO2 22 (*)     Glucose 152 (*)     BUN 15      Creatinine 0.8      Calcium 8.9      Protein Total 6.7      Albumin 4.1      Bilirubin Total 0.4      ALP 61       AST 31      ALT 31      Anion Gap 9      eGFR >60     D DIMER, QUANTITATIVE - Abnormal    D-Dimer 0.53 (*)    TROPONIN I HIGH SENSITIVITY - Normal    Troponin High Sensitive <3     NT-PRO NATRIURETIC PEPTIDE - Normal    NT-proBNP <16      Narrative:     NOTE:  Access complete set of age - and/or gender-specific reference intervals for this test in the [facility] Collection Manual.   CBC WITH DIFFERENTIAL - Normal    WBC 7.43      RBC 5.12      HGB 15.1      HCT 43.2      MCV 84      MCH 29.5      MCHC 35.0      RDW 12.9      Platelet Count 182      MPV 10.0      Nucleated RBC 0      Neut % 58.0      Lymph % 27.6      Mono % 9.7      Eos % 4.0      Basophil % 0.4      Imm Grans % 0.3      Neut # 4.31      Lymph # 2.05      Mono # 0.72      Eos # 0.30      Baso # 0.03      Imm Grans # 0.02     CBC W/ AUTO DIFFERENTIAL    Narrative:     The following orders were created for panel order CBC auto differential.  Procedure                               Abnormality         Status                     ---------                               -----------         ------                     CBC with Differential[[id number]]       Normal              Final result                 Please view results for these tests on the individual orders.     EKG Readings: (Independently Interpreted)   EKG personally reviewed by me shows normal sinus rhythm, nonspecific T-wave changes.  86 beats per minute, MI interval 150, .  No ischemic change, no arrhythmia.         Imaging Results              X-Ray Chest AP Portable (Final result)  Result time 07/26/25 14:43:46      Final result by Terence Rivera MD (07/26/25 14:43:46)                   Impression:      No acute abnormality.      Electronically signed by: Terence Rivera  Date:    07/26/2025  Time:    14:43               Narrative:    EXAMINATION:  XR CHEST AP PORTABLE    CLINICAL HISTORY:  Other chest pain    TECHNIQUE:  Single frontal portable view of the chest was  performed.    COMPARISON:  None    FINDINGS:  Normal cardiomediastinal silhouette.  Lungs are clear without airspace consolidation, pleural effusion, or pneumothorax.  No acute bony abnormality.                                    X-Rays:   Independently Interpreted Readings:   Other Readings:  Chest x-ray personally reviewed by me shows clear lungs, normal cardiac silhouette, normal skeletal structures.    Medications   aspirin tablet 325 mg (325 mg Oral Given 7/26/25 1357)     Medical Decision Making  Differential includes myocardial ischemia or infarction, GERD, gastritis, heart failure, pericarditis, costochondritis, pleurisy, etc.    Patient maintains that he feels swollen and he has gained several pounds over the last several days.  His BNP was negative however.  Will prescribe 5 day course of Lasix as a trial.  Kidney function is good.  Electrolytes are normal.  Troponin normal, EKG unremarkable.  I believe he is safe for discharge at this time.  He will return here for any worsening signs or symptoms.    Amount and/or Complexity of Data Reviewed  Labs: ordered.  Radiology: ordered.    Risk  OTC drugs.  Prescription drug management.                                          Clinical Impression:  Final diagnoses:  [R07.9] Chest pain  [R07.89] Chest discomfort          ED Disposition Condition    Discharge Stable          ED Prescriptions       Medication Sig Dispense Start Date End Date Auth. Provider    furosemide (LASIX) 20 MG tablet Take 1 tablet (20 mg total) by mouth once daily. for 5 days 5 tablet 7/26/2025 7/31/2025 Taqueria Blanchard MD          Follow-up Information       Follow up With Specialties Details Why Contact Info    Armando Wyman MD Family Medicine Call in 2 days  [address] [facility] [address]  [facility] [id number] [phone]      Your cardiologist  Call in 2 days      [facility] Emergency Medicine  As needed, If symptoms worsen [address] [facility]  [id number]  [phone]                   [1]   Social History  Tobacco Use    Smoking status: Former     Current packs/day: 0.00     Types: Cigarettes     Quit date: 2024     Years since quittin.6   Substance Use Topics    Alcohol use: Yes     Comment: rare    Drug use: Never        Taqueria Blanchard MD  [phone]

## 2025-07-28 ENCOUNTER — PATIENT MESSAGE (OUTPATIENT)
Dept: ADMINISTRATIVE | Facility: OTHER | Age: 50
End: 2025-07-28
Payer: COMMERCIAL

## 2025-07-28 LAB
OHS QRS DURATION: 82 MS
OHS QTC CALCULATION: 394 MS

## 2025-08-15 ENCOUNTER — PATIENT MESSAGE (OUTPATIENT)
Dept: FAMILY MEDICINE | Facility: CLINIC | Age: 50
End: 2025-08-15
Payer: COMMERCIAL

## 2025-08-15 ENCOUNTER — HOSPITAL ENCOUNTER (OUTPATIENT)
Dept: CARDIOLOGY | Facility: HOSPITAL | Age: 50
Discharge: HOME | End: 2025-08-15
Attending: STUDENT IN AN ORGANIZED HEALTH CARE EDUCATION/TRAINING PROGRAM
Payer: COMMERCIAL

## 2025-08-15 VITALS — HEIGHT: 70 IN | BODY MASS INDEX: 42.95 KG/M2 | WEIGHT: 300 LBS

## 2025-08-15 DIAGNOSIS — R07.9 CHEST PAIN, UNSPECIFIED TYPE: ICD-10-CM

## 2025-08-15 DIAGNOSIS — E29.1 HYPOGONADISM IN MALE: Primary | ICD-10-CM

## 2025-08-15 LAB
AORTIC ROOT ANNULUS: 3 CM
AORTIC SIZE INDEX (SOV): 1 CM/M2
AORTIC SIZE INDEX: 1 CM/M2
AORTIC VALVE CUSP SEPERATION: 1.98 CM
ASCENDING AORTA: 2.4 CM
AV INDEX (PROSTH): 0.71
AV MEAN GRADIENT: 6 MMHG
AV PEAK GRADIENT: 12 MMHG
AV VALVE AREA BY VELOCITY RATIO: 2.5 CM²
AV VALVE AREA: 2.7 CM²
AV VELOCITY RATIO: 0.65
BSA FOR ECHO PROCEDURE: 2.59 M2
CV ECHO LV RWT: 0.49 CM
CV STRESS BASE HR: 96 BPM
DIASTOLIC BLOOD PRESSURE: 69 MMHG
DOP CALC AO PEAK VEL: 1.7 M/S
DOP CALC AO VTI: 30.9 CM
DOP CALC LVOT AREA: 3.8 CM2
DOP CALC LVOT DIAMETER: 2.2 CM
DOP CALC LVOT PEAK VEL: 1.1 M/S
DOP CALC MV VTI: 23.3 CM
DOP CALCLVOT PEAK VEL VTI: 21.9 CM
E WAVE DECELERATION TIME: 222 MSEC
E/A RATIO: 1.25
E/E' RATIO: 7 M/S
ECHO LV POSTERIOR WALL: 1 CM (ref 0.6–1.1)
EJECTION FRACTION: 63 %
FRACTIONAL SHORTENING: 34.1 % (ref 28–44)
GLOBAL LONGITUIDAL STRAIN: 19 %
INTERVENTRICULAR SEPTUM: 1.1 CM (ref 0.6–1.1)
IVC DIAMETER: 2.03 CM
LA MAJOR: 3.9 CM
LA MINOR: 2.5 CM
LEFT ATRIUM AREA SYSTOLIC (APICAL 2 CHAMBER): 6.63 CM2
LEFT ATRIUM AREA SYSTOLIC (APICAL 4 CHAMBER): 13.19 CM2
LEFT ATRIUM SIZE: 3.8 CM
LEFT ATRIUM VOLUME INDEX MOD: 8 ML/M2
LEFT ATRIUM VOLUME MOD: 19 ML
LEFT INTERNAL DIMENSION IN SYSTOLE: 2.7 CM (ref 2.1–4)
LEFT VENTRICLE DIASTOLIC VOLUME INDEX: 29.03 ML/M2
LEFT VENTRICLE DIASTOLIC VOLUME: 72 ML
LEFT VENTRICLE END SYSTOLIC VOLUME APICAL 2 CHAMBER: 9.81 ML
LEFT VENTRICLE END SYSTOLIC VOLUME APICAL 4 CHAMBER: 31.42 ML
LEFT VENTRICLE MASS INDEX: 57.1 G/M2
LEFT VENTRICLE SYSTOLIC VOLUME INDEX: 10.5 ML/M2
LEFT VENTRICLE SYSTOLIC VOLUME: 26 ML
LEFT VENTRICULAR INTERNAL DIMENSION IN DIASTOLE: 4.1 CM (ref 3.5–6)
LEFT VENTRICULAR MASS: 141.5 G
LV LATERAL E/E' RATIO: 6.8 M/S
LV SEPTAL E/E' RATIO: 7.5 M/S
LVED V (TEICH): 71.96 ML
LVES V (TEICH): 26.47 ML
LVOT MG: 2.72 MMHG
LVOT MV: 0.78 CM/S
Lab: 1.3 CM/M
Lab: 1.5 CM/M
MV MEAN GRADIENT: 2 MMHG
MV PEAK A VEL: 0.6 M/S
MV PEAK E VEL: 0.75 M/S
MV PEAK GRADIENT: 4 MMHG
MV STENOSIS PRESSURE HALF TIME: 49.14 MS
MV VALVE AREA BY CONTINUITY EQUATION: 3.57 CM2
MV VALVE AREA P 1/2 METHOD: 4.48 CM2
OHS CV CPX 1 MINUTE RECOVERY HEART RATE: 9 BPM
OHS CV CPX 85 PERCENT MAX PREDICTED HEART RATE MALE: 146
OHS CV CPX ESTIMATED METS: 9
OHS CV CPX MAX PREDICTED HEART RATE: 172
OHS CV CPX PATIENT HEIGHT IN: 70
OHS CV CPX PATIENT IS FEMALE: 0
OHS CV CPX PATIENT IS MALE: 1
OHS CV CPX PEAK DIASTOLIC BLOOD PRESSURE: 54 MMHG
OHS CV CPX PEAK HEAR RATE: 146 BPM
OHS CV CPX PEAK RATE PRESSURE PRODUCT: NORMAL
OHS CV CPX PEAK SYSTOLIC BLOOD PRESSURE: 246 MMHG
OHS CV CPX PERCENT MAX PREDICTED HEART RATE ACHIEVED: 85
OHS CV CPX RATE PRESSURE PRODUCT PRESENTING: NORMAL
OHS CV RV/LV RATIO: 0.78 CM
PISA MRMAX VEL: 1.33 M/S
PISA TR MAX VEL: 1.7 M/S
PULM VEIN S/D RATIO: 1.4
PV MV: 1.05 M/S
PV PEAK D VEL: 0.5 M/S
PV PEAK GRADIENT: 8 MMHG
PV PEAK S VEL: 0.7 M/S
PV PEAK VELOCITY: 1.45 M/S
RA MAJOR: 3.92 CM
RA PRESSURE ESTIMATED: 3 MMHG
RA WIDTH: 2.52 CM
RIGHT VENTRICLE DIASTOLIC BASEL DIMENSION: 3.2 CM
RIGHT VENTRICLE DIASTOLIC LENGTH: 6.6 CM
RIGHT VENTRICLE DIASTOLIC MID DIMENSION: 1.4 CM
RIGHT VENTRICLE FREE WALL STRAIN: 24.1 %
RIGHT VENTRICLE GLOBAL SYSTOLIC STRAIN: 21 %
RIGHT VENTRICULAR END-DIASTOLIC DIMENSION: 3.23 CM
RIGHT VENTRICULAR LENGTH IN DIASTOLE (APICAL 4-CHAMBER VIEW): 6.58 CM
RV MID DIAMA: 1.4 CM
RV TB RVSP: 5 MMHG
SINUS: 2.6 CM
STJ: 2.6 CM
STRESS ECHO POST EXERCISE DUR MIN: 7 MINUTES
STRESS ECHO POST EXERCISE DUR SEC: 7 SECONDS
SYSTOLIC BLOOD PRESSURE: 162 MMHG
TDI LATERAL: 0.11 M/S
TDI SEPTAL: 0.1 M/S
TDI: 0.11 M/S
TR MAX PG: 12 MMHG
TRICUSPID ANNULAR PLANE SYSTOLIC EXCURSION: 2.4 CM
TRICUSPID VALVE PEAK A WAVE VELOCITY: 0.4 M/S
TV PEAK E VEL: 0.6 M/S
TV REST PULMONARY ARTERY PRESSURE: 15 MMHG
Z-SCORE OF LEFT VENTRICULAR DIMENSION IN END DIASTOLE: -11.71
Z-SCORE OF LEFT VENTRICULAR DIMENSION IN END SYSTOLE: -8.45

## 2025-08-15 PROCEDURE — 93356 MYOCRD STRAIN IMG SPCKL TRCK: CPT | Mod: ,,, | Performed by: INTERNAL MEDICINE

## 2025-08-15 PROCEDURE — 93018 CV STRESS TEST I&R ONLY: CPT | Mod: ,,, | Performed by: INTERNAL MEDICINE

## 2025-08-15 PROCEDURE — 93016 CV STRESS TEST SUPVJ ONLY: CPT | Mod: ,,, | Performed by: INTERNAL MEDICINE

## 2025-08-15 PROCEDURE — 93017 CV STRESS TEST TRACING ONLY: CPT

## 2025-08-15 PROCEDURE — 93227 XTRNL ECG REC<48 HR R&I: CPT | Mod: ,,, | Performed by: INTERNAL MEDICINE

## 2025-08-15 PROCEDURE — 93306 TTE W/DOPPLER COMPLETE: CPT

## 2025-08-15 PROCEDURE — 93225 XTRNL ECG REC<48 HRS REC: CPT

## 2025-08-15 PROCEDURE — 93306 TTE W/DOPPLER COMPLETE: CPT | Mod: 26,,, | Performed by: INTERNAL MEDICINE

## 2025-08-18 ENCOUNTER — RESULTS FOLLOW-UP (OUTPATIENT)
Dept: FAMILY MEDICINE | Facility: CLINIC | Age: 50
End: 2025-08-18

## 2025-08-18 DIAGNOSIS — E29.1 HYPOGONADISM IN MALE: ICD-10-CM

## 2025-08-18 RX ORDER — TESTOSTERONE CYPIONATE 1000 MG/10ML
100 INJECTION, SOLUTION INTRAMUSCULAR
Qty: 12 ML | Refills: 1 | Status: SHIPPED | OUTPATIENT
Start: 2025-08-18 | End: 2025-11-16

## 2025-08-19 ENCOUNTER — PATIENT MESSAGE (OUTPATIENT)
Dept: FAMILY MEDICINE | Facility: CLINIC | Age: 50
End: 2025-08-19
Payer: COMMERCIAL

## 2025-08-19 LAB
OHS CV EVENT MONITOR DAY: 0
OHS CV HOLTER LENGTH DECIMAL HOURS: 47.98
OHS CV HOLTER LENGTH HOURS: 47
OHS CV HOLTER LENGTH MINUTES: 59
OHS CV HOLTER SINUS AVERAGE HR: 89
OHS CV HOLTER SINUS MAX HR: 128
OHS CV HOLTER SINUS MIN HR: 62

## 2025-08-21 ENCOUNTER — PATIENT MESSAGE (OUTPATIENT)
Dept: FAMILY MEDICINE | Facility: CLINIC | Age: 50
End: 2025-08-21
Payer: COMMERCIAL

## 2025-09-03 ENCOUNTER — TELEPHONE (OUTPATIENT)
Dept: UROLOGY | Facility: CLINIC | Age: 50
End: 2025-09-03
Payer: COMMERCIAL

## 2025-09-03 ENCOUNTER — PATIENT MESSAGE (OUTPATIENT)
Dept: FAMILY MEDICINE | Facility: CLINIC | Age: 50
End: 2025-09-03
Payer: COMMERCIAL

## 2025-09-03 DIAGNOSIS — E29.1 HYPOGONADISM IN MALE: Primary | ICD-10-CM

## 2025-09-04 ENCOUNTER — TELEPHONE (OUTPATIENT)
Dept: FAMILY MEDICINE | Facility: CLINIC | Age: 50
End: 2025-09-04
Payer: COMMERCIAL